# Patient Record
Sex: MALE | Race: WHITE | NOT HISPANIC OR LATINO | Employment: UNEMPLOYED | URBAN - METROPOLITAN AREA
[De-identification: names, ages, dates, MRNs, and addresses within clinical notes are randomized per-mention and may not be internally consistent; named-entity substitution may affect disease eponyms.]

---

## 2018-08-14 ENCOUNTER — OFFICE VISIT CONVERTED (OUTPATIENT)
Dept: FAMILY MEDICINE CLINIC | Facility: CLINIC | Age: 28
End: 2018-08-14
Attending: NURSE PRACTITIONER

## 2018-08-16 ENCOUNTER — OFFICE VISIT CONVERTED (OUTPATIENT)
Dept: ORTHOPEDIC SURGERY | Facility: CLINIC | Age: 28
End: 2018-08-16
Attending: ORTHOPAEDIC SURGERY

## 2018-09-04 ENCOUNTER — OFFICE VISIT CONVERTED (OUTPATIENT)
Dept: ORTHOPEDIC SURGERY | Facility: CLINIC | Age: 28
End: 2018-09-04
Attending: PHYSICIAN ASSISTANT

## 2018-10-10 ENCOUNTER — OFFICE VISIT CONVERTED (OUTPATIENT)
Dept: ORTHOPEDIC SURGERY | Facility: CLINIC | Age: 28
End: 2018-10-10
Attending: PHYSICIAN ASSISTANT

## 2021-05-09 VITALS
DIASTOLIC BLOOD PRESSURE: 80 MMHG | BODY MASS INDEX: 30.18 KG/M2 | SYSTOLIC BLOOD PRESSURE: 120 MMHG | HEART RATE: 99 BPM | OXYGEN SATURATION: 98 % | HEIGHT: 62 IN | WEIGHT: 164 LBS | TEMPERATURE: 97.2 F

## 2021-05-11 NOTE — H&P
History and Physical      Patient Name: Angel Smith   Patient ID: [id number]   Sex: Male   YOB: 1990    Primary Care Provider: Aditi JEONG   Referring Provider: Aditi JEONG    Visit Date: August 16, 2018    Provider: Felipe Hyatt MD   Location: [facility]   Location Address: [address] [place] [address]  [id number]   Location Phone: [phone]          History Of Present Illness  Angel Smith is a 28 year old /White male who presents to [facility] today. He was referred by Aditi JEONG.      New pt  Left Tibia Fracture  Xrays today  Xrays previously at [facility] and [facility]  28-year-old male, new patient referred here today for left tibia shaft fracture.  About 31 days ago, he sustained a mechanical fall where he broke his left midshaft tibia, short oblique fracture.  He was seen at his local hospital at [place], referred to [facility].  Orthopedists and him up there, he elected to not have surgery and was placed just into a walking boot, and even a cast nor fracture brace.  Subsequent is gone on to not have any success in healing, he'll having pain in that left leg.  He is unable to walk, and has not been walking on it.  He denies any associated numbness or tingling, just pain and abnormality about his left leg.           Past Medical History  Blood clot in vein; Swollen ankles; Tibia fracture--left         Allergy List  NO KNOWN DRUG ALLERGIES         Family Medical History  *No Known Family History         Social History  Tobacco (Current every day)         Review of Systems  · Constitutional  o Denies  o : weight loss, weight gain  · Eyes  o Denies  o : blurred vision, double vision, loss of vision  · HENT  o Denies  o : hoarseness, dysphagia, hearing loss  · Cardiovascular  o Denies  o : chest pain, palpitations  · Respiratory  o Denies  o : shortness of breath, chronic  "cough  · Gastrointestinal  o Denies  o : nausea, blood in stools, heartburn, liver disease  · Genitourinary  o Denies  o : dysuria, hematuria, kidney problems, abnormal color of urine  · Integument  o Denies  o : rash, skin ulcers, lumps, psoriasis  · Neurologic  o Denies  o : headaches, seizures, dizziness  · Musculoskeletal  o Denies  o : additional musculoskeletal symptoms except as noted in the HPI  · Endocrine  o Denies  o : thyroid disease, heat/cold intolerance  · Psychiatric  o Denies  o : depression, Drug/Alcohol Addiction  · Heme-Lymph  o Denies  o : easy bruising, easy bleeding      Vitals  Date Time BP Position Site L\R Cuff Size HR RR TEMP(F) WT  HT  BMI kg/m2 BSA m2 O2 Sat HC       08/16/2018 09:36 AM       16  175lbs 0oz 6'  3\" 21.87 2.05            Physical Examination  · Constitutional  o Appearance  o : healthy appearing, no acute distress  · Eyes  o Sclerae  o : sclerae white  · Neck  o Inspection/Palpation  o : supple  · Respiratory  o Respiratory Effort  o : breathing unlabored  o Inspection of Chest  o : normal appearance, no retractions  · Cardiovascular  o Heart  o : regular rate  · Gastrointestinal  o Abdominal Examination  o : abdomen nontender   · Skin and Subcutaneous Tissue  o General Inspection  o : no lesions present, no areas of discoloration, skin turgor normal, texture normal  · Neurologic  o Gait and Station  o :   § Gait Screening  § : normal gait  o Muskuloskeletal  o :   § LLE  § : neurovascularly intact from L1-S1, sensation is intact to all five nerves of the foot  · Vascular  o Comprehensive  o : LLE DP/PT pulse symmetrical  · Psychiatric  o General  o : Alert and oriented x3  o Mood and Affect  o : mood normal, affect appropriate  · Left Lower Extremity  o Inspection  o : erythema absent, swelling absent, laceration absent  o Palpation  o : Tibial tenderness present   o Special Tests  o : With slight motion, they're still mobility at the fracture site          Results   "   X-rays ordered today in clinic, independently reviewed by me.  AP and lateral x-rays of left tibia obtained for fracture, no comparison views available.  X-rays today in clinic show some bony callus forming around this fracture, there is no bridging bony callus.  No pathologic lesions       Assessment  · Tibia fracture--left     823.80/S82.209A      Plan  · Orders  o Tib/Fib (Left) 2 views X-Ray [facility] Preferred View ([id number]) - 823.80/S82.209A - 08/16/2018  o MAIDA Report (KASPR) - 823.80/S82.209A - 08/16/2018  o e-prescribe - at least one () - 823.80/S82.209A - 08/16/2018  · Instructions  o Reviewed the patient's Past Medical, Social, and Family history as well as the ROS at today's visit, no changes.  o Call or return if worsening symptoms.  o Discussed surgery.  o Risks/benefits discussed with patient, including but not limited to: infection, bleeding, neurovascular damage, malunion, nonunion, aesthetic deformity, need for further surgery, and death.  o Independently reviewed xrays.  o Reviewed old notes/xrays.     I discussed options the patient of operative and nonoperative management.  Patient desires surgery for intramedullary nail left tibia malunion.  The real question and I discussed with him is, how much healing is actually present.  If there is bridging bony callus then I may have to open this fracture take down the bony callus by performing an osteotomy, and/or malunion takedown, and then possibly fixing it with some sort of a bone graft or bone stimulatory substance after the implantation of the nail.  I discussed with the patient the high risk of surgical complications with this since I would have to open the fracture site, as well as disrupt the previous present healing and then asked the body to heal of the second time.  He agrees with all this as well as risks and he wishes to proceed             Electronically Signed by: Felipe Hyatt MD -Author on August 16, 2018 01:33:10 PM

## 2021-05-14 NOTE — PROGRESS NOTES
Progress Note      Patient Name: Angel Smith   Patient ID: [id number]   Sex: Male   YOB: 1990    Primary Care Provider: Aditi JEONG   Referring Provider: Aditi JEONG    Visit Date: September 4, 2018    Provider: Tyler Healy PA-C   Location: [facility]   Location Address: [address] [place] [address]  [id number]   Location Phone: [phone]          Chief Complaint  · Left tibia fracture      History Of Present Illness  Angel Smith is a 28 year old /White male who presents to [facility] today.      S/P ORIF left tibia shaft fracture  Surgery 8/20/18.   2 week follow-up left midshaft tibia fracture was a malunion.  Patient states that he removed his splint this morning before coming to the office visit.  Still having diffuse left lower extremity pain.  No repeat injury.  Patient is using a walker for ambulation.       Past Medical History  Blood clot in vein; Swollen ankles; Tibia fracture--left         Past Surgical History  Fracture Repair         Medication List  Percocet 7.5-325 mg oral tablet         Allergy List  NO KNOWN DRUG ALLERGIES         Family Medical History  *No Known Family History         Social History  Tobacco (Current every day)         Review of Systems  · Constitutional  o Denies  o : weight loss, weight gain  · Eyes  o Denies  o : blurred vision, double vision, loss of vision  · HENT  o Denies  o : hoarseness, dysphagia, hearing loss  · Cardiovascular  o Denies  o : chest pain, palpitations  · Respiratory  o Denies  o : shortness of breath, chronic cough  · Gastrointestinal  o Denies  o : nausea, blood in stools, heartburn, liver disease  · Genitourinary  o Denies  o : dysuria, hematuria, kidney problems, abnormal color of urine  · Integument  o Denies  o : rash, skin ulcers, lumps, psoriasis  · Neurologic  o Denies  o : headaches, seizures,  dizziness  · Musculoskeletal  o Denies  o : additional musculoskeletal symptoms except as noted in the HPI  · Endocrine  o Denies  o : thyroid disease, heat/cold intolerance  · Psychiatric  o Denies  o : depression, Drug/Alcohol Addiction  · Heme-Lymph  o Denies  o : easy bruising, easy bleeding      Physical Examination  · Constitutional  o Appearance  o : healthy appearing, no acute distress  · Eyes  o Sclerae  o : sclerae white  · Respiratory  o Respiratory Effort  o : breathing unlabored  o Inspection of Chest  o : normal appearance, no retractions  · Cardiovascular  o Heart  o : regular rate  · Skin and Subcutaneous Tissue  o General Inspection  o : no lesions present  · Neurologic  o Gait and Station  o :   § Gait Screening  § : Using a walker.  o Muskuloskeletal  o :   § LLE  § : neurovascularly intact from L1-S1, sensation is intact to all five nerves of the foot  · Vascular  o Comprehensive  o : LLE DP/PT pulse symmetrical  · Psychiatric  o General  o : Alert and oriented x3  o Mood and Affect  o : mood normal, affect appropriate  · Left Lower Extremity  o Inspection  o : erythema absent, swelling absent, laceration absent  o Palpation  o : Mild mid shaft tenderness.  o Special Tests  o : He can move all 5 phalanges left foot without difficulty. Patient can fully flex and extend knee without any problems.          Results     2 view left tib-fib series, obtained in clinic today.  Independently reviewed by me.  Comparison films from [facility] database.  No change from comparison films.  I M brody is intact without any evidence of loosening.  Residual bone callus is unchanged from comparison films.  Fracture is in anatomic alignment.       Assessment  · Closed displaced spiral fracture of shaft of left tibia with routine healing, subsequent encounter     V54.16/S82.242D      Plan  · Orders  o Tib/Fib (Left) 2 views X-Ray [facility] Preferred View ([id number]) - - 09/04/2018  o e-prescribe - at least one () - -  09/04/2018  o Tobacco cessation counseling completed (2744F) - - 09/04/2018  · Instructions  o Reviewed the patient's Past Medical, Social, and Family history as well as the ROS at today's visit, no changes.  o Call or return if worsening symptoms.  o Independently reviewed xrays.  o Reviewed old notes/xrays.  o Case and plan of care discussed with Dr. Hyatt. Sutures removed today. Patient will be nonweightbearing until his next office visit. He will be placed into an orthopedic boot today. Patient will receive a pain med refill today. At next office visit, he will start partial weightbearing for 2 weeks then progress to full. Follow-up October 10.            Electronically Signed by: Tyler Healy PA-C -Author on September 4, 2018 09:50:07 AM

## 2021-05-14 NOTE — PROGRESS NOTES
Progress Note      Patient Name: Angel Smith   Patient ID: [id number]   Sex: Male   YOB: 1990    Primary Care Provider: Aditi JEONG   Referring Provider: Aditi JEONG    Visit Date: October 10, 2018    Provider: Tyler Healy PA-C   Location: [facility]   Location Address: [address] [place] [address]  [id number]   Location Phone: [phone]          Chief Complaint  · Follow up, Left Malunion Tib Fx      History Of Present Illness  Angel Smith is a 28 year old /White male who presents to [facility] today.      Follow up, Left malunion tib fracture  S/P ORIF Left tibia shaft fracture  Surgery 8/20/18  Xrays today  Patient reports he hasn't been wearing the boot at all due to pain and pressure. He has been full weight bearing. He complains of pain intermittently.  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  He since the patient has been noncompliant since last office visit, he is not even supposed to be weightbearing at this time.       Past Medical History  Blood clot in vein; Swollen ankles; Tibia fracture--left         Past Surgical History  Fracture Repair         Medication List  Percocet 5-325 mg oral tablet         Allergy List  NO KNOWN DRUG ALLERGIES         Family Medical History  *No Known Family History         Social History  Tobacco (Current every day)         Review of Systems  · Constitutional  o Denies  o : weight loss, weight gain  · Eyes  o Denies  o : blurry vision, double vision, loss of vision  · HENT  o Denies  o : hoarseness, dysphagia, loss of hearing  · Cardiovascular  o Denies  o : chest pain, palpitations  · Respiratory  o Denies  o : shortness of breath, chronic cough  · Gastrointestinal  o Denies  o : nausea, blood in stools, heartburn, liver disease  · Genitourinary  o Denies  o : dysuria, hematuria, kidney problems, abnormal color of  "urine  · Integument  o Denies  o : rash, skin ulcers, lumps, psoriasis  · Neurologic  o Denies  o : headaches, seizures, dizziness  · Musculoskeletal  o Denies  o : additional musculoskeletal symptoms except as noted in the HPI  · Endocrine  o Denies  o : thyroid disease, heat/cold intolerance  · Psychiatric  o Denies  o : depression, Drug/Alcohol Addiction  · Heme-Lymph  o Denies  o : easy bruising, easy bleeding      Vitals  Date Time BP Position Site L\R Cuff Size HR RR TEMP(F) WT  HT  BMI kg/m2 BSA m2 O2 Sat HC       10/10/2018 02:12 PM       16  175lbs 0oz 6'  3\" 21.87 2.05            Physical Examination  · Constitutional  o Appearance  o : healthy appearing, no acute distress  · Eyes  o Sclerae  o : sclerae white  · Neck  o Inspection/Palpation  o : supple  · Respiratory  o Respiratory Effort  o : breathing unlabored  o Inspection of Chest  o : normal appearance, no retractions  · Cardiovascular  o Heart  o : regular rate  · Skin and Subcutaneous Tissue  o General Inspection  o : no lesions present  o Surgical Incision  o : incision healing well, no swelling, no erythema, no drainage, well healed  · Neurologic  o Gait and Station  o :   § Gait Screening  § : Mild limping gait.  o Muskuloskeletal  o :   § LLE  § : neurovascularly intact from L1-S1, sensation is intact to all five nerves of the foot  · Vascular  o Comprehensive  o : LLE DP/PT pulse symmetrical  · Psychiatric  o General  o : Alert and oriented x3  o Mood and Affect  o : mood normal, affect appropriate  · Left Lower Extremity  o Inspection  o : erythema absent, swelling present, laceration absent  o Palpation  o : Tenderness midshaft tibia  o Special Tests  o : Patient has regained full range of motion left knee and ankle          Results     Two-view left lower leg series, obtained in clinic today.  Independently reviewed by me.  Comparison films from [facility] database.  Midshaft tibia fracture has abundant amount of bone callus evident.  IM brody " is intact without loosening.       Assessment  · Closed displaced spiral fracture of shaft of left tibia with routine healing, subsequent encounter     V54.16/S82.242D      Plan  · Orders  o Tib/Fib (Left) 2 views X-Ray [facility] Preferred View ([id number]) - - 10/10/2018  o MAIDA Report (KASPR) - - 10/10/2018  o e-prescribe - at least one () - - 10/10/2018  o Tobacco cessation counseling completed (4002F) - - 10/10/2018  · Instructions  o Reviewed the patient's Past Medical, Social, and Family history as well as the ROS at today's visit, no changes.  o Call or return if worsening symptoms.  o Independently reviewed xrays.  o Reviewed old notes/xrays.  o Weight bearing as tolerated.  o Since patient's been noncompliant and weightbearing up until this point he can continue to be full weightbearing without impact activities. Follow-up 4 weeks.            Electronically Signed by: Tyler Healy PA-C -Author on October 10, 2018 02:59:10 PM

## 2021-05-16 VITALS — RESPIRATION RATE: 16 BRPM | WEIGHT: 175 LBS | BODY MASS INDEX: 21.76 KG/M2 | HEIGHT: 75 IN

## 2021-05-16 VITALS — HEIGHT: 75 IN | BODY MASS INDEX: 21.76 KG/M2 | RESPIRATION RATE: 16 BRPM | WEIGHT: 175 LBS

## 2021-05-16 VITALS — RESPIRATION RATE: 16 BRPM | BODY MASS INDEX: 21.76 KG/M2 | HEIGHT: 75 IN | WEIGHT: 175 LBS

## 2021-10-06 ENCOUNTER — HOSPITAL ENCOUNTER (EMERGENCY)
Facility: HOSPITAL | Age: 31
Discharge: HOME | End: 2021-10-06
Attending: EMERGENCY MEDICINE | Admitting: EMERGENCY MEDICINE

## 2021-10-06 VITALS
SYSTOLIC BLOOD PRESSURE: 129 MMHG | TEMPERATURE: 97.8 F | RESPIRATION RATE: 16 BRPM | HEIGHT: 76 IN | DIASTOLIC BLOOD PRESSURE: 87 MMHG | HEART RATE: 97 BPM | BODY MASS INDEX: 23.41 KG/M2 | WEIGHT: 192.24 LBS | OXYGEN SATURATION: 99 %

## 2021-10-06 DIAGNOSIS — K02.9 PAIN DUE TO DENTAL CARIES: Primary | ICD-10-CM

## 2021-10-06 PROCEDURE — 99283 EMERGENCY DEPT VISIT LOW MDM: CPT

## 2021-10-06 RX ORDER — LIDOCAINE HYDROCHLORIDE 20 MG/ML
10 SOLUTION OROPHARYNGEAL AS NEEDED
Qty: 20 ML | Refills: 0 | OUTPATIENT
Start: 2021-10-06 | End: 2023-01-29

## 2021-10-06 RX ORDER — ACETAMINOPHEN 160 MG/5ML
650 SOLUTION ORAL ONCE
Status: COMPLETED | OUTPATIENT
Start: 2021-10-06 | End: 2021-10-06

## 2021-10-06 RX ORDER — OXYCODONE HYDROCHLORIDE AND ACETAMINOPHEN 5; 325 MG/1; MG/1
1 TABLET ORAL ONCE
Status: COMPLETED | OUTPATIENT
Start: 2021-10-06 | End: 2021-10-06

## 2021-10-06 RX ORDER — LIDOCAINE HYDROCHLORIDE 20 MG/ML
10 SOLUTION OROPHARYNGEAL
Status: DISCONTINUED | OUTPATIENT
Start: 2021-10-06 | End: 2021-10-07 | Stop reason: HOSPADM

## 2021-10-06 RX ORDER — IBUPROFEN 800 MG/1
800 TABLET ORAL EVERY 6 HOURS PRN
Qty: 30 TABLET | Refills: 0 | OUTPATIENT
Start: 2021-10-06 | End: 2023-01-29

## 2021-10-06 RX ORDER — PENICILLIN V POTASSIUM 500 MG/1
500 TABLET ORAL 4 TIMES DAILY
Qty: 28 TABLET | Refills: 0 | OUTPATIENT
Start: 2021-10-06 | End: 2023-01-29

## 2021-10-06 RX ADMIN — ACETAMINOPHEN ORAL SOLUTION 649.6 MG: 650 SOLUTION ORAL at 22:54

## 2021-10-06 RX ADMIN — BENZOCAINE 2 SPRAY: 200 SPRAY DENTAL; ORAL; PERIODONTAL at 22:55

## 2021-10-06 RX ADMIN — OXYCODONE HYDROCHLORIDE AND ACETAMINOPHEN 1 TABLET: 5; 325 TABLET ORAL at 22:54

## 2021-10-06 RX ADMIN — LIDOCAINE HYDROCHLORIDE 10 ML: 20 SOLUTION ORAL; TOPICAL at 22:54

## 2021-10-07 NOTE — ED PROVIDER NOTES
Subjective     History provided by:  Patient  Dental Pain  Location:  Generalized  Quality:  Sharp  Severity:  Severe  Onset quality:  Gradual  Duration:  1 week  Timing:  Constant  Progression:  Worsening  Chronicity:  Recurrent  Context: abscess, dental caries and poor dentition    Context: cap still on, not crown fracture, not dental fracture, not enamel fracture, filling intact, not intrusion, not malocclusion, not recent dental surgery and not trauma    Relieved by:  Nothing  Worsened by:  Cold food/drink, touching and jaw movement  Ineffective treatments:  NSAIDs  Associated symptoms: no congestion, no difficulty swallowing, no drooling, no facial pain, no facial swelling, no fever, no gum swelling, no headaches, no neck pain, no neck swelling, no oral bleeding, no oral lesions and no trismus    Risk factors: lack of dental care        Review of Systems   Constitutional: Negative for chills and fever.   HENT: Positive for dental problem. Negative for congestion, drooling, ear pain, facial swelling, mouth sores and sore throat.    Eyes: Negative for pain.   Respiratory: Negative for cough, chest tightness and shortness of breath.    Cardiovascular: Negative for chest pain.   Gastrointestinal: Negative for abdominal pain, diarrhea, nausea and vomiting.   Genitourinary: Negative for flank pain and hematuria.   Musculoskeletal: Negative for joint swelling and neck pain.   Skin: Negative for pallor.   Neurological: Negative for seizures and headaches.   All other systems reviewed and are negative.      History reviewed. No pertinent past medical history.    No Known Allergies    History reviewed. No pertinent surgical history.    History reviewed. No pertinent family history.    Social History     Socioeconomic History   • Marital status: Single     Spouse name: Not on file   • Number of children: Not on file   • Years of education: Not on file   • Highest education level: Not on file   Tobacco Use   • Smoking  status: Current Every Day Smoker     Packs/day: 1.50   Substance and Sexual Activity   • Drug use: Never           Objective   Physical Exam  Vitals and nursing note reviewed.   Constitutional:       General: He is not in acute distress.     Appearance: Normal appearance. He is not toxic-appearing.   HENT:      Head: Normocephalic and atraumatic.      Mouth/Throat:      Mouth: Mucous membranes are moist. No angioedema.      Dentition: Dental tenderness and dental caries present.     Eyes:      Extraocular Movements: Extraocular movements intact.      Pupils: Pupils are equal, round, and reactive to light.   Cardiovascular:      Rate and Rhythm: Normal rate and regular rhythm.      Pulses: Normal pulses.      Heart sounds: Normal heart sounds.   Pulmonary:      Effort: Pulmonary effort is normal. No respiratory distress.      Breath sounds: Normal breath sounds.   Abdominal:      General: Abdomen is flat.      Palpations: Abdomen is soft.      Tenderness: There is no abdominal tenderness.   Musculoskeletal:         General: Normal range of motion.      Cervical back: Normal range of motion and neck supple.   Skin:     General: Skin is warm and dry.   Neurological:      Mental Status: He is alert and oriented to person, place, and time. Mental status is at baseline.         Procedures           ED Course                                           MDM  Number of Diagnoses or Management Options  Pain due to dental caries: established and worsening  Diagnosis management comments: I have spoken with the patient. I have explained the patient´s condition, diagnoses and treatment plan based on the information available to me at this time. I have answered the patient's questions and addressed any concerns. The patient has a good  understanding of the patient´s diagnosis, condition, and treatment plan as can be expected at this point. The vital signs have been stable. The patient´s condition is stable and appropriate for  discharge from the emergency department.      The patient will pursue further outpatient evaluation with the primary care physician or other designated or consulting physician as outlined in the discharge instructions. They are agreeable to this plan of care and follow-up instructions have been explained in detail. The patient has received these instructions in written format and have expressed an understanding of the discharge instructions. The patient is aware that any significant change in condition or worsening of symptoms should prompt an immediate return to this or the closest emergency department or call to 911.    Risk of Complications, Morbidity, and/or Mortality  Presenting problems: minimal  Diagnostic procedures: minimal  Management options: minimal    Patient Progress  Patient progress: stable      Final diagnoses:   Pain due to dental caries       ED Disposition  ED Disposition     ED Disposition Condition Comment    Discharge Stable           Provider, No Known  [facility]  [place] [address]    In 3 days      [facility]  [address] [place] [id number]  [phone]  Call            Medication List      New Prescriptions    ibuprofen 800 MG tablet  Commonly known as: ADVIL,MOTRIN  Take 1 tablet by mouth Every 6 (Six) Hours As Needed for Mild Pain .     Lidocaine Viscous HCl 2 % solution  Commonly known as: XYLOCAINE  Take 10 mL by mouth As Needed for Mild Pain .     penicillin v potassium 500 MG tablet  Commonly known as: VEETID  Take 1 tablet by mouth 4 (Four) Times a Day.           Where to Get Your Medications      These medications were sent to [facility]/pharmacy #[id number] - [place], [place] - [address] - [phone] [facility] [phone]   [address] [place] [address]    Hours: 24-hours Phone: [phone]   · ibuprofen 800 MG tablet  · Lidocaine Viscous HCl 2 % solution  · penicillin v potassium 500 MG tablet          Kvng  Dariusz Pandya, APRN  10/06/21 2965

## 2023-01-29 ENCOUNTER — HOSPITAL ENCOUNTER (EMERGENCY)
Facility: HOSPITAL | Age: 33
Discharge: HOME | End: 2023-01-29
Attending: EMERGENCY MEDICINE | Admitting: EMERGENCY MEDICINE
Payer: MEDICAID

## 2023-01-29 VITALS
DIASTOLIC BLOOD PRESSURE: 94 MMHG | BODY MASS INDEX: 25.11 KG/M2 | RESPIRATION RATE: 20 BRPM | WEIGHT: 201.94 LBS | SYSTOLIC BLOOD PRESSURE: 141 MMHG | HEART RATE: 101 BPM | OXYGEN SATURATION: 96 % | HEIGHT: 75 IN | TEMPERATURE: 98 F

## 2023-01-29 DIAGNOSIS — K08.89 PAIN, DENTAL: Primary | ICD-10-CM

## 2023-01-29 PROCEDURE — 99283 EMERGENCY DEPT VISIT LOW MDM: CPT

## 2023-01-29 RX ORDER — PENICILLIN V POTASSIUM 250 MG/1
500 TABLET ORAL ONCE
Status: COMPLETED | OUTPATIENT
Start: 2023-01-29 | End: 2023-01-29

## 2023-01-29 RX ORDER — OXYCODONE HYDROCHLORIDE AND ACETAMINOPHEN 5; 325 MG/1; MG/1
1 TABLET ORAL ONCE
Status: COMPLETED | OUTPATIENT
Start: 2023-01-29 | End: 2023-01-29

## 2023-01-29 RX ORDER — PENICILLIN V POTASSIUM 500 MG/1
500 TABLET ORAL 4 TIMES DAILY
Qty: 28 TABLET | Refills: 0 | Status: SHIPPED | OUTPATIENT
Start: 2023-01-29 | End: 2023-02-05

## 2023-01-29 RX ADMIN — PENICILLIN V POTASSIUM 500 MG: 250 TABLET, FILM COATED ORAL at 19:13

## 2023-01-29 RX ADMIN — OXYCODONE AND ACETAMINOPHEN 1 TABLET: 5; 325 TABLET ORAL at 19:10
